# Patient Record
Sex: MALE | Race: WHITE | Employment: FULL TIME | URBAN - NONMETROPOLITAN AREA
[De-identification: names, ages, dates, MRNs, and addresses within clinical notes are randomized per-mention and may not be internally consistent; named-entity substitution may affect disease eponyms.]

---

## 2022-12-07 ENCOUNTER — HOSPITAL ENCOUNTER (EMERGENCY)
Age: 22
Discharge: HOME | End: 2022-12-07
Attending: EMERGENCY MEDICINE

## 2022-12-07 VITALS
DIASTOLIC BLOOD PRESSURE: 72 MMHG | RESPIRATION RATE: 20 BRPM | SYSTOLIC BLOOD PRESSURE: 128 MMHG | OXYGEN SATURATION: 96 % | HEART RATE: 104 BPM | TEMPERATURE: 98.2 F | WEIGHT: 200 LBS

## 2022-12-07 DIAGNOSIS — J03.90 ACUTE TONSILLITIS, UNSPECIFIED ETIOLOGY: Primary | ICD-10-CM

## 2022-12-07 LAB
FLU A ANTIGEN: NEGATIVE
FLU B ANTIGEN: NEGATIVE

## 2022-12-07 PROCEDURE — 99203 OFFICE O/P NEW LOW 30 MIN: CPT

## 2022-12-07 PROCEDURE — 87804 INFLUENZA ASSAY W/OPTIC: CPT

## 2022-12-07 RX ORDER — CEFDINIR 300 MG/1
300 CAPSULE ORAL 2 TIMES DAILY
Qty: 14 CAPSULE | Refills: 0 | Status: SHIPPED | OUTPATIENT
Start: 2022-12-07 | End: 2022-12-07 | Stop reason: SDUPTHER

## 2022-12-07 RX ORDER — IBUPROFEN 600 MG/1
600 TABLET ORAL 3 TIMES DAILY PRN
Qty: 20 TABLET | Refills: 0 | Status: SHIPPED | OUTPATIENT
Start: 2022-12-07

## 2022-12-07 RX ORDER — CEFDINIR 300 MG/1
300 CAPSULE ORAL 2 TIMES DAILY
Qty: 14 CAPSULE | Refills: 0 | Status: SHIPPED | OUTPATIENT
Start: 2022-12-07 | End: 2022-12-14

## 2022-12-07 ASSESSMENT — ENCOUNTER SYMPTOMS
STRIDOR: 0
ROS SKIN COMMENTS: NO RASH OR BRUISING
DIARRHEA: 0
VOICE CHANGE: 0
NAUSEA: 0
EYE DISCHARGE: 0
SINUS PRESSURE: 0
SORE THROAT: 1
SHORTNESS OF BREATH: 0
BACK PAIN: 0
EYE PAIN: 0
COUGH: 0
ABDOMINAL PAIN: 0
WHEEZING: 0
VOMITING: 0
TROUBLE SWALLOWING: 0
EYE REDNESS: 0

## 2022-12-07 ASSESSMENT — PAIN SCALES - GENERAL: PAINLEVEL_OUTOF10: 7

## 2022-12-07 ASSESSMENT — PAIN DESCRIPTION - LOCATION: LOCATION: HEAD

## 2022-12-07 ASSESSMENT — PAIN - FUNCTIONAL ASSESSMENT
PAIN_FUNCTIONAL_ASSESSMENT: 0-10
PAIN_FUNCTIONAL_ASSESSMENT: PREVENTS OR INTERFERES SOME ACTIVE ACTIVITIES AND ADLS

## 2022-12-07 ASSESSMENT — PAIN DESCRIPTION - DESCRIPTORS: DESCRIPTORS: ACHING

## 2022-12-07 ASSESSMENT — PAIN DESCRIPTION - PAIN TYPE: TYPE: ACUTE PAIN

## 2022-12-07 ASSESSMENT — PAIN DESCRIPTION - FREQUENCY: FREQUENCY: CONTINUOUS

## 2022-12-07 NOTE — ED PROVIDER NOTES
[place] [id number]  Urgent Care Encounter      CHIEF COMPLAINT       Chief Complaint   Patient presents with    Headache     Fever, cough         Nurses Notes reviewed and I agree except as noted in the HPI. HISTORY OF PRESENT ILLNESS   Benja Helton is a 23 y.o. male who presents with 2-day history of sore throat, fever to 100, dry cough, headache, muscle aches and fatigue. Patient rates sore throat at 7 out of 10 in severity. No chest pain, shortness of breath, abdominal pain, vomiting, dizziness, syncope, altered mental status, lethargy, rash,  symptoms. No history of asthma or diabetes. Non-smoker  REVIEW OF SYSTEMS     Review of Systems   Constitutional:  Positive for appetite change, fatigue and fever. Negative for chills and unexpected weight change. Decreased appetite fever to 100, fatigue   HENT:  Positive for congestion and sore throat. Negative for ear discharge, ear pain, sinus pressure, sneezing, trouble swallowing and voice change. Congestion, sore throat   Eyes:  Negative for pain, discharge and redness. No redness or drainage   Respiratory:  Negative for cough, shortness of breath, wheezing and stridor. Cough no shortness of breath   Cardiovascular:  Negative for chest pain and leg swelling. No chest pain or syncope   Gastrointestinal:  Negative for abdominal pain, diarrhea, nausea and vomiting. No abdominal pain or vomiting   Genitourinary:  Negative for dysuria, frequency, hematuria and urgency. Musculoskeletal:  Negative for arthralgias, back pain, myalgias and neck pain. Skin:  Negative for rash. No rash or bruising   Neurological:  Positive for headaches. Negative for dizziness, syncope and weakness. Headache no lethargy   Hematological:  Negative for adenopathy. Psychiatric/Behavioral:  Negative for behavioral problems, confusion, sleep disturbance and suicidal ideas. The patient is not nervous/anxious. Red and bold elements reviewed  PAST MEDICAL HISTORY   History reviewed. No pertinent past medical history. SURGICAL HISTORY     Patient  has no past surgical history on file. CURRENT MEDICATIONS       Discharge Medication List as of 12/7/2022 11:11 AM          ALLERGIES     Patient is is allergic to pcn [penicillins]. FAMILY HISTORY     Patient'sfamily history is not on file. SOCIAL HISTORY     Patient  reports that he has never smoked. He has never used smokeless tobacco. He reports current alcohol use. He reports that he does not use drugs. PHYSICAL EXAM     ED TRIAGE VITALS  BP: 128/72, Temp: 98.2 °F (36.8 °C), Heart Rate: (!) 104, Resp: 20, SpO2: 96 %  Physical Exam  Vitals and nursing note reviewed. Constitutional:       General: He is not in acute distress. Appearance: He is well-developed. He is not ill-appearing. Comments: Moist membranes   HENT:      Head: Normocephalic and atraumatic. Right Ear: Tympanic membrane and external ear normal.      Left Ear: Tympanic membrane and external ear normal.      Nose: Nose normal. No congestion or rhinorrhea. Mouth/Throat:      Pharynx: No oropharyngeal exudate. Tonsils: Tonsillar exudate present. No tonsillar abscesses. 2+ on the right. 2+ on the left. Comments: Erythematous tonsils and pharynx with exudate no abscess  Eyes:      General: No scleral icterus. Right eye: No discharge. Left eye: No discharge. Extraocular Movements:      Right eye: Normal extraocular motion. Left eye: Normal extraocular motion. Conjunctiva/sclera: Conjunctivae normal.      Pupils: Pupils are equal, round, and reactive to light. Comments: , Clear conjunctiva   Neck:      Thyroid: No thyromegaly. Vascular: No JVD. Comments: No meningismus  Cardiovascular:      Rate and Rhythm: Normal rate and regular rhythm. Pulses: Normal pulses.       Heart sounds: Normal heart sounds, S1 normal and S2 normal. No murmur heard. No friction rub. No gallop. Comments: Heart rate 96 no murmur  Pulmonary:      Effort: Pulmonary effort is normal. No tachypnea or respiratory distress. Breath sounds: Normal breath sounds. No stridor. No decreased breath sounds, wheezing, rhonchi or rales. Comments: No Cough lungs clear  Chest:      Chest wall: No tenderness. Abdominal:      General: Bowel sounds are normal. There is no distension. Palpations: Abdomen is soft. There is no mass. Tenderness: There is no abdominal tenderness. There is no guarding or rebound. Musculoskeletal:         General: No tenderness. Normal range of motion. Cervical back: Normal range of motion. Comments: Extremities nl   Lymphadenopathy:      Cervical: Cervical adenopathy present. Right cervical: Superficial cervical adenopathy present. No deep cervical adenopathy. Left cervical: Superficial cervical adenopathy present. No deep cervical adenopathy. Skin:     General: Skin is warm and dry. Findings: No erythema or rash. Comments: No rash or bruise   Neurological:      Mental Status: He is alert and oriented to person, place, and time. Cranial Nerves: No cranial nerve deficit. Motor: No abnormal muscle tone. Coordination: Coordination normal.      Deep Tendon Reflexes: Reflexes are normal and symmetric. Reflexes normal.      Comments: Appropriate no focal   Psychiatric:         Behavior: Behavior normal.         Thought Content:  Thought content normal.         Judgment: Judgment normal.       DIAGNOSTIC RESULTS   Labs:  Results for orders placed or performed during the hospital encounter of 12/07/22   Rapid influenza A/B antigens   Result Value Ref Range    Flu A Antigen Negative NEGATIVE    Flu B Antigen Negative NEGATIVE       IMAGING:  No orders to display      URGENT CARE COURSE:     Vitals:    12/07/22 1006   BP: 128/72   Pulse: (!) 104   Resp: 20   Temp: 98.2 °F (36.8 °C) TempSrc: Temporal   SpO2: 96%   Weight: 200 lb (90.7 kg)       Medications - No data to display  PROCEDURES:  None  FINAL IMPRESSION      1. Acute tonsillitis, unspecified etiology        DISPOSITION/PLAN   DISPOSITION Decision To Discharge 12/07/2022 11:08:07 AM  Nontoxic, well-hydrated, normal airway. No airway abscess or epiglottitis, sepsis, CNS infection, pneumonia, hypoxia, bronchospasm. Rapid influenza negative. Patient has exudative tonsillitis. Will treat with Omnicef, ibuprofen, increased oral liquids, rest.  Patient to recheck with family medicine practice in 5 days if problems persist and he understands to go to ED if worse  PATIENT REFERRED TO:  [address] [place] [address],[address]  [facility]  In 5 days  Recheck if problems persist, go to emergency if worse    DISCHARGE MEDICATIONS:  Discharge Medication List as of 12/7/2022 11:11 AM        START taking these medications    Details   ibuprofen (IBU) 600 MG tablet Take 1 tablet by mouth 3 times daily as needed for Pain or Fever (Take with food 3 times daily for pain or fever), Disp-20 tablet, R-0Print      cefdinir (OMNICEF) 300 MG capsule Take 1 capsule by mouth 2 times daily for 7 days, Disp-14 capsule, R-0Normal           Discharge Medication List as of 12/7/2022 11:11 AM          MD Vishnu Shah MD  12/07/22 1065

## 2022-12-07 NOTE — Clinical Note
Benja Helton was seen and treated in our emergency department on 12/7/2022. He may return to work on 12/09/2022. No work December 7 and December 8, 2022     If you have any questions or concerns, please don't hesitate to call.       Hayley Rosado MD

## 2022-12-07 NOTE — ED TRIAGE NOTES
Patient to room with c/o intermittent nonproductive cough, headache, fatigue, and fever beginning two days ago.